# Patient Record
Sex: MALE | Race: WHITE | Employment: OTHER | URBAN - METROPOLITAN AREA
[De-identification: names, ages, dates, MRNs, and addresses within clinical notes are randomized per-mention and may not be internally consistent; named-entity substitution may affect disease eponyms.]

---

## 2019-09-05 ENCOUNTER — APPOINTMENT (OUTPATIENT)
Dept: GENERAL RADIOLOGY | Age: 49
End: 2019-09-05
Attending: NURSE PRACTITIONER

## 2019-09-05 ENCOUNTER — HOSPITAL ENCOUNTER (EMERGENCY)
Age: 49
Discharge: HOME | End: 2019-09-05
Attending: EMERGENCY MEDICINE

## 2019-09-05 VITALS
DIASTOLIC BLOOD PRESSURE: 98 MMHG | SYSTOLIC BLOOD PRESSURE: 139 MMHG | HEIGHT: 74 IN | WEIGHT: 195 LBS | HEART RATE: 72 BPM | BODY MASS INDEX: 25.03 KG/M2 | RESPIRATION RATE: 16 BRPM | TEMPERATURE: 98 F | OXYGEN SATURATION: 99 %

## 2019-09-05 DIAGNOSIS — S49.91XA INJURY OF RIGHT SHOULDER, INITIAL ENCOUNTER: Primary | ICD-10-CM

## 2019-09-05 DIAGNOSIS — M75.51 BURSITIS OF RIGHT SHOULDER: ICD-10-CM

## 2019-09-05 PROCEDURE — 99283 EMERGENCY DEPT VISIT LOW MDM: CPT

## 2019-09-05 PROCEDURE — 73030 X-RAY EXAM OF SHOULDER: CPT | Performed by: NURSE PRACTITIONER

## 2019-09-05 RX ORDER — TRAMADOL HYDROCHLORIDE 50 MG/1
50 TABLET ORAL EVERY 6 HOURS PRN
Qty: 10 TABLET | Refills: 0 | Status: SHIPPED | OUTPATIENT
Start: 2019-09-05 | End: 2019-09-12

## 2019-09-05 RX ORDER — HYDROCODONE BITARTRATE AND ACETAMINOPHEN 5; 325 MG/1; MG/1
2 TABLET ORAL ONCE
Status: DISCONTINUED | OUTPATIENT
Start: 2019-09-05 | End: 2019-09-05

## 2019-09-05 NOTE — ED PROVIDER NOTES
Patient Seen in: [facility] In [place]    History   Patient presents with:  Upper Extremity Injury (musculoskeletal)    Stated Complaint: r. shoulder pain after helping a friend do drywall on sat    51-year-old male who presents to th O2 Device 09/05/19 3249 None (Room air)       Current:BP (!) 139/98   Pulse 72   Temp 97.7 °F (36.5 °C) (Tympanic)   Resp 16   Ht 188 cm (6' 2\")   Wt 88.5 kg   SpO2 99%   BMI 25.04 kg/m²         Physical Exam   Constitutional: He is oriented to person, his right shoulder since installing drywall to a ceiling 2 days ago. He denies any specific injury and states the pain kept getting increasingly worse the longer he worked on the [employer].   The pain radiates across the anterior aspect of the joint and does medications    traMADol HCl 50 MG Oral Tab  Take 1 tablet (50 mg total) by mouth every 6 (six) hours as needed for Pain., Print, Disp-10 tablet, R-0

## 2023-07-27 NOTE — ED INITIAL ASSESSMENT (HPI)
Patient presented to the ED with right shoulder pain. Patient states \"two days ago I was doing drywall. No I can't raise my arm. \" 1

## (undated) NOTE — ED AVS SNAPSHOT
Lynette Slim   MRN: [id number]    Department:  [facility] Department in [place]   Date of Visit:  9/5/2019           Disclosure     Insurance plans vary and the physician(s) referred by the ER may not be covered by your plan.  Please contact your tell this physician (or your personal doctor if your instructions are to return to your personal doctor) about any new or lasting problems. The primary care or specialist physician will see patients referred from the [facility] Emergency Department.  Kayley Castro